# Patient Record
Sex: MALE | Race: WHITE | Employment: FULL TIME | URBAN - METROPOLITAN AREA
[De-identification: names, ages, dates, MRNs, and addresses within clinical notes are randomized per-mention and may not be internally consistent; named-entity substitution may affect disease eponyms.]

---

## 2018-09-03 ENCOUNTER — HOSPITAL ENCOUNTER (EMERGENCY)
Age: 28
Discharge: HOME | End: 2018-09-03
Payer: COMMERCIAL

## 2018-09-03 ENCOUNTER — APPOINTMENT (OUTPATIENT)
Dept: GENERAL RADIOLOGY | Age: 28
End: 2018-09-03
Payer: COMMERCIAL

## 2018-09-03 VITALS
WEIGHT: 190 LBS | SYSTOLIC BLOOD PRESSURE: 132 MMHG | RESPIRATION RATE: 18 BRPM | OXYGEN SATURATION: 99 % | TEMPERATURE: 97 F | HEIGHT: 71 IN | HEART RATE: 74 BPM | DIASTOLIC BLOOD PRESSURE: 83 MMHG | BODY MASS INDEX: 26.6 KG/M2

## 2018-09-03 DIAGNOSIS — S90.32XA CONTUSION OF LEFT FOOT, INITIAL ENCOUNTER: Primary | ICD-10-CM

## 2018-09-03 PROCEDURE — 73630 X-RAY EXAM OF FOOT: CPT

## 2018-09-03 PROCEDURE — 99283 EMERGENCY DEPT VISIT LOW MDM: CPT

## 2018-09-03 RX ORDER — IBUPROFEN 600 MG/1
600 TABLET ORAL ONCE
Status: COMPLETED | OUTPATIENT
Start: 2018-09-03 | End: 2018-09-03

## 2018-11-04 ENCOUNTER — CHARTING TRANS (OUTPATIENT)
Dept: OTHER | Age: 28
End: 2018-11-04

## 2018-11-06 ENCOUNTER — CHARTING TRANS (OUTPATIENT)
Dept: OTHER | Age: 28
End: 2018-11-06

## 2019-10-28 ENCOUNTER — TELEPHONE (OUTPATIENT)
Dept: SCHEDULING | Age: 29
End: 2019-10-28

## 2020-01-31 NOTE — ED PROVIDER NOTES
Health Maintenance Due   Topic Date Due   • Abdominal Aortic Aneurysm (AAA) Screening  11/30/2010   • Medicare Wellness 65+  12/04/2019       Patient is due for topics as listed above but is not proceeding with Abdominal Aortic Aneurysm (AAA) screening at this time. Education provided for Abdominal Aortic Aneurysm (AAA) screening.      Writer will add supplements, Psuedofed and Claritin to profile.             Patient Seen in: [facility] In [place]    History   Patient presents with:  Lower Extremity Injury (musculoskeletal)    Stated Complaint: left foot injury    HPI  Patient is a 26-year-old male presents with left foot injury sustained Feet are warm with capillary refill < 3 seconds B/L  DP and PT pulses +2. Feet warm and pink bilaterally with brisk capillary refill in nail   Neurological: He is alert and oriented to person, place, and time. Skin: Skin is warm and dry. No rash noted. There are no discharge medications for this patient.

## 2024-10-22 ENCOUNTER — APPOINTMENT (OUTPATIENT)
Dept: GENERAL RADIOLOGY | Age: 34
End: 2024-10-22
Attending: EMERGENCY MEDICINE
Payer: COMMERCIAL

## 2024-10-22 ENCOUNTER — HOSPITAL ENCOUNTER (EMERGENCY)
Age: 34
Discharge: HOME | End: 2024-10-22
Attending: EMERGENCY MEDICINE
Payer: COMMERCIAL

## 2024-10-22 VITALS
TEMPERATURE: 99 F | WEIGHT: 200 LBS | SYSTOLIC BLOOD PRESSURE: 151 MMHG | HEIGHT: 70 IN | OXYGEN SATURATION: 99 % | DIASTOLIC BLOOD PRESSURE: 81 MMHG | BODY MASS INDEX: 28.63 KG/M2 | RESPIRATION RATE: 18 BRPM | HEART RATE: 80 BPM

## 2024-10-22 DIAGNOSIS — R07.9 CHEST PAIN OF UNCERTAIN ETIOLOGY: Primary | ICD-10-CM

## 2024-10-22 LAB
ALBUMIN SERPL-MCNC: 4.4 G/DL (ref 3.4–5)
ALBUMIN/GLOB SERPL: 1 {RATIO} (ref 1–2)
ALP LIVER SERPL-CCNC: 68 U/L
ALT SERPL-CCNC: 62 U/L
ANION GAP SERPL CALC-SCNC: 6 MMOL/L (ref 0–18)
AST SERPL-CCNC: 41 U/L (ref 15–37)
BASOPHILS # BLD AUTO: 0.08 X10(3) UL (ref 0–0.2)
BASOPHILS NFR BLD AUTO: 1.2 %
BILIRUB SERPL-MCNC: 0.4 MG/DL (ref 0.1–2)
BUN BLD-MCNC: 14 MG/DL (ref 9–23)
CALCIUM BLD-MCNC: 9.7 MG/DL (ref 8.5–10.1)
CHLORIDE SERPL-SCNC: 104 MMOL/L (ref 98–112)
CO2 SERPL-SCNC: 26 MMOL/L (ref 21–32)
CREAT BLD-MCNC: 0.87 MG/DL
D DIMER PPP FEU-MCNC: <0.27 UG/ML FEU (ref ?–0.5)
EGFRCR SERPLBLD CKD-EPI 2021: 116 ML/MIN/1.73M2 (ref 60–?)
EOSINOPHIL # BLD AUTO: 0.24 X10(3) UL (ref 0–0.7)
EOSINOPHIL NFR BLD AUTO: 3.7 %
ERYTHROCYTE [DISTWIDTH] IN BLOOD BY AUTOMATED COUNT: 12.6 %
GLOBULIN PLAS-MCNC: 4.2 G/DL (ref 2.8–4.4)
GLUCOSE BLD-MCNC: 91 MG/DL (ref 70–99)
HCT VFR BLD AUTO: 40.9 %
HGB BLD-MCNC: 14.6 G/DL
IMM GRANULOCYTES # BLD AUTO: 0.03 X10(3) UL (ref 0–1)
IMM GRANULOCYTES NFR BLD: 0.5 %
LYMPHOCYTES # BLD AUTO: 2.2 X10(3) UL (ref 1–4)
LYMPHOCYTES NFR BLD AUTO: 33.7 %
MCH RBC QN AUTO: 33 PG (ref 26–34)
MCHC RBC AUTO-ENTMCNC: 35.7 G/DL (ref 31–37)
MCV RBC AUTO: 92.3 FL
MONOCYTES # BLD AUTO: 0.53 X10(3) UL (ref 0.1–1)
MONOCYTES NFR BLD AUTO: 8.1 %
NEUTROPHILS # BLD AUTO: 3.44 X10 (3) UL (ref 1.5–7.7)
NEUTROPHILS # BLD AUTO: 3.44 X10(3) UL (ref 1.5–7.7)
NEUTROPHILS NFR BLD AUTO: 52.8 %
OSMOLALITY SERPL CALC.SUM OF ELEC: 282 MOSM/KG (ref 275–295)
PLATELET # BLD AUTO: 218 10(3)UL (ref 150–450)
POTASSIUM SERPL-SCNC: 3.5 MMOL/L (ref 3.5–5.1)
PROT SERPL-MCNC: 8.6 G/DL (ref 6.4–8.2)
RBC # BLD AUTO: 4.43 X10(6)UL
SODIUM SERPL-SCNC: 136 MMOL/L (ref 136–145)
TROPONIN I SERPL HS-MCNC: 4 NG/L
WBC # BLD AUTO: 6.5 X10(3) UL (ref 4–11)

## 2024-10-22 PROCEDURE — 80053 COMPREHEN METABOLIC PANEL: CPT | Performed by: EMERGENCY MEDICINE

## 2024-10-22 PROCEDURE — 84484 ASSAY OF TROPONIN QUANT: CPT | Performed by: EMERGENCY MEDICINE

## 2024-10-22 PROCEDURE — 93005 ELECTROCARDIOGRAM TRACING: CPT

## 2024-10-22 PROCEDURE — 93010 ELECTROCARDIOGRAM REPORT: CPT

## 2024-10-22 PROCEDURE — 99285 EMERGENCY DEPT VISIT HI MDM: CPT

## 2024-10-22 PROCEDURE — 85025 COMPLETE CBC W/AUTO DIFF WBC: CPT | Performed by: EMERGENCY MEDICINE

## 2024-10-22 PROCEDURE — 99284 EMERGENCY DEPT VISIT MOD MDM: CPT

## 2024-10-22 PROCEDURE — 36415 COLL VENOUS BLD VENIPUNCTURE: CPT

## 2024-10-22 PROCEDURE — 85379 FIBRIN DEGRADATION QUANT: CPT | Performed by: EMERGENCY MEDICINE

## 2024-10-22 PROCEDURE — 71045 X-RAY EXAM CHEST 1 VIEW: CPT | Performed by: EMERGENCY MEDICINE

## 2024-10-22 RX ORDER — ASPIRIN 81 MG/1
324 TABLET, CHEWABLE ORAL ONCE
Status: COMPLETED | OUTPATIENT
Start: 2024-10-22 | End: 2024-10-22

## 2024-10-22 NOTE — ED INITIAL ASSESSMENT (HPI)
Pt with c/o worsening chest pressure and sensation of being able to feel his heart beat that started on Sunday. Pt notes intermittent episodes of sharp pain.

## 2024-10-22 NOTE — ED PROVIDER NOTES
Patient Seen in: [facility] In [place]      History     Chief Complaint   Patient presents with    Chest Pain     Stated Complaint: Pt with c/o chest pressure that started last Sunday    Subjective:   HPI      Patient is a 34-year-old male who states that for the last 2 to 3 days he has had persistent chest discomfort.  Patient  describes it as occasionally throbbing pressure occasional shockwaves going up.  Patient states he sometimes feels short of breath, no pain with deep breaths, no nausea vomiting, occasional cough, no fevers or chills, no abdominal pain, no diaphoresis.  Patient denies any syncope, no chest pain with exertion.  Patient denies any aggravating alleviating factors.  Remainder of review of systems negative.    Objective:     History reviewed. No pertinent past medical history.       No hypertension, no diabetes, high cholesterol, no previous coronary disease, no history of PE DVT    History reviewed. No pertinent surgical history.             Social History     Socioeconomic History    Marital status:    Tobacco Use    Smoking status: Never    Smokeless tobacco: Current   Vaping Use    Vaping status: Never Used   Substance and Sexual Activity    Alcohol use: Yes     Comment: weekly    Drug use: Never     Social Drivers of Health      Received from [facility]    [facility]          Occasional alcohol, no drugs, non-smoker, occasional nicotine pouch    Family history no history of PE DVT.  Possible premature coronary disease on mother side        Physical Exam     ED Triage Vitals [10/22/24 1635]   /81   Pulse 96   Resp 16   Temp 98.5 °F (36.9 °C)   Temp src Temporal   SpO2 100 %   O2 Device None (Room air)       Current Vitals:   Vital Signs  BP: 151/81  Pulse: 80  Resp: 18  Temp: 98.5 °F (36.9 °C)  Temp src: Temporal    Oxygen Therapy  SpO2: 99 %  O2 Device: None (Room air)        Physical Exam   GENERAL: Patient resting comfortably on the cart in no acute  distress.  HEENT: Extraocular muscles intact, pupils equal round reactive to light and accommodation.  Mouth normal, neck supple, no meningismus.  LUNGS: Lungs clear to auscultation bilaterally.  CARDIOVASCULAR: + S1-S2, regular rate and rhythm, no murmurs.  BACK: No CVA tenderness, no midline bony tenderness.  ABDOMEN: + Bowel sounds, soft, nontender, nondistended.  No rebound, no guarding, no hepatosplenomegaly.  EXTREMITIES: Full range of motion, no tenderness, good capillary refill.  No calf tenderness or edema  SKIN: No rash, good turgor.  NEURO: Patient answers questions appropriately.  No focal deficits appreciated.        ED Course     Labs Reviewed   COMP METABOLIC PANEL (14) - Abnormal; Notable for the following components:       Result Value    AST 41 (*)     ALT 62 (*)     Total Protein 8.6 (*)     All other components within normal limits   TROPONIN I HIGH SENSITIVITY - Normal   D-DIMER - Normal   CBC WITH DIFFERENTIAL WITH PLATELET   RAINBOW DRAW LAVENDER   RAINBOW DRAW LIGHT GREEN   RAINBOW DRAW BLUE     EKG    Rate, intervals and axes as noted on EKG Report.  Rate: 84  Rhythm: Sinus Rhythm  Reading: Normal sinus rhythm, nonspecific ST changes, right bundle branch block, no old 1 to compare to.                Chest x-ray No acute cardiopulmonary findings.   Independent viewed by myself, no pneumothorax       MDM      Patient was given aspirin.  Patient declined any other medications for pain or Maalox.  Patient did have negative troponin, negative D-dimer.  Patient works as a  and states he has had previous stress test which has been negative.  Patient states he does not feel anxious.  Patient is low risk based on heart score criteria.  Patient felt comfortable going home.  Patient is advised to follow-up with his primary doctor for likely outpatient stress test and is advised to call tomorrow.  Patient given cardiology if issues getting into his primary doctor.  Patient is comfortable  with this plan.  Patient is advised take aspirin daily.  No strenuous exertional workup is complete.  I did consider ACS, GERD, PE, pneumothorax, musculoskeletal pain.  Patient liver enzymes were slightly elevated but did have alcohol this weekend.  Patient states he does workout daily and may have strained something.        Medical Decision Making      Disposition and Plan     Clinical Impression:  1. Chest pain of uncertain etiology         Disposition:  Discharge  10/22/2024  6:39 pm    Follow-up:  Alfredo Nicholson  [address] [place] [id number] [phone]    Follow up in 1 day(s)      Calderon Mast MD  [address] [address]  [phone]    Follow up in 2 day(s)  As needed          Medications Prescribed:  There are no discharge medications for this patient.          Supplementary Documentation:

## 2024-10-22 NOTE — DISCHARGE INSTRUCTIONS
Follow-up for further evaluation with primary physician or cardiology as discussed.  Call tomorrow for an appointment.  Recommend aspirin daily.  No strenuous exertion until workup is complete.  Return if new or worse symptoms.  May use Prilosec over-the-counter as discussed.

## 2024-10-24 LAB
ATRIAL RATE: 84 BPM
P AXIS: 74 DEGREES
P-R INTERVAL: 134 MS
Q-T INTERVAL: 404 MS
QRS DURATION: 128 MS
QTC CALCULATION (BEZET): 477 MS
R AXIS: -11 DEGREES
T AXIS: 14 DEGREES
VENTRICULAR RATE: 84 BPM

## (undated) NOTE — ED AVS SNAPSHOT
Garrison Elmore   MRN: [id number]    Department:  THE [facility] Emergency Department in [place]   Date of Visit:  9/3/2018           Disclosure     Insurance plans vary and the physician(s) referred by the ER may not be covered by your plan.  Please contact tell this physician (or your personal doctor if your instructions are to return to your personal doctor) about any new or lasting problems. The primary care or specialist physician will see patients referred from the [facility] Emergency Department.  Raymond Valles

## (undated) NOTE — LETTER
Date & Time: 9/3/2018, 2:18 PM  Patient: Vel Challenger  Encounter Provider(s):    On File, SERGIO SHAH Attending  LASHAWN Sahu       To Whom It May Concern:    Mirna Cardoza was seen and treated in our department on 9/3/2018.  He should not return to work u